# Patient Record
Sex: MALE | Race: WHITE | Employment: UNEMPLOYED | URBAN - METROPOLITAN AREA
[De-identification: names, ages, dates, MRNs, and addresses within clinical notes are randomized per-mention and may not be internally consistent; named-entity substitution may affect disease eponyms.]

---

## 2021-01-31 ENCOUNTER — HOSPITAL ENCOUNTER (EMERGENCY)
Facility: CLINIC | Age: 31
Discharge: HOME | End: 2021-02-01
Attending: EMERGENCY MEDICINE | Admitting: EMERGENCY MEDICINE
Payer: COMMERCIAL

## 2021-01-31 DIAGNOSIS — R41.82 ALTERED MENTAL STATUS, UNSPECIFIED ALTERED MENTAL STATUS TYPE: ICD-10-CM

## 2021-01-31 DIAGNOSIS — F10.920 ALCOHOLIC INTOXICATION WITHOUT COMPLICATION (H): ICD-10-CM

## 2021-01-31 LAB — GLUCOSE BLDC GLUCOMTR-MCNC: 129 MG/DL (ref 70–99)

## 2021-01-31 PROCEDURE — 999N001017 HC STATISTIC GLUCOSE BY METER IP

## 2021-01-31 PROCEDURE — 99285 EMERGENCY DEPT VISIT HI MDM: CPT

## 2021-02-01 VITALS
WEIGHT: 175 LBS | OXYGEN SATURATION: 95 % | HEART RATE: 101 BPM | SYSTOLIC BLOOD PRESSURE: 102 MMHG | DIASTOLIC BLOOD PRESSURE: 66 MMHG | TEMPERATURE: 96.5 F

## 2021-02-01 NOTE — ED PROVIDER NOTES
Patient was signed out to me awaiting clinical sobriety.  Briefly, he presented intoxicated with alcohol last night.  He has no sign of traumatic injury.  He does not have any medical complaints.  He does not wish to have resources for alcohol treatment.  He does not think he will withdraw.    6 AM he is clinically sober.  Ambulatory and steady on his feet.  He is not suicidal or homicidal.  He is discharged, and advised that he may return at any time for signs of withdrawal.     Karthikeyan Wan MD  02/01/21 0612

## 2021-02-01 NOTE — ED PROVIDER NOTES
History   Chief Complaint:  Alcohol Intoxication     HPI   Ramiro Elizondo is a 30 year old male who presents with altered mental status and alcohol intoxication.  Was found altered vs sleeping on the light rail.  Reported drinking alcohol today.  Denies drug use, chest pain, shortness of breath, abdominal pain, injury.  Denies drug use.  Is sleeping and only answers direct questions with short responses.  History limited.    Review of Systems  Limited due to intoxications    Allergies:  The patient has no known allergies.     Medications:  Denies    Past Medical History:     Patient denies a past medical history.    Physical Exam     Patient Vitals for the past 24 hrs:   BP Temp Temp src Pulse SpO2 Weight   01/31/21 2159 118/81 96.5  F (35.8  C) Temporal 92 95 % 79.4 kg (175 lb)       Physical Exam  Eyes:  Sclera white; Pupils are equal and round  ENT:    External ears and nares normal  CV:  Rate as above with regular rhythm   Resp:  Breath sounds clear and equal bilaterally  GI:  Abdomen is soft, non-tender, non-distended    No rebound tenderness or peritoneal features  MS:  Moves all extremities  Skin:  Warm and dry  Neuro:  Speech is slurred    Emergency Department Course     Laboratory:  POC glucose 129    Procedures    Emergency Department Course:    Reviewed:  I reviewed nursing notes and vitals    Assessments:  2213 I obtained history and examined the patient as noted above.       Impression & Plan     Medical Decision Making:  Ramiro Elizondo is a 30 year old male who presents for evaluation of alcohol related issues.  He is intoxicated here in ED by clinical exam and report.  No vital sign abnormalities to suggest infection and no exam findings of trauma or stroke.  He has no signs of trauma related to alcohol use and no further workup is needed including head CT.  Will need further observation in ED for sobering prior to being safe for discharge.  If clinical course is not consistent with alcohol  then additional workup can be obtained.  Signed out to Dr. Wan.    Diagnosis:    ICD-10-CM    1. Altered mental status, unspecified altered mental status type  R41.82    2. Alcoholic intoxication without complication (H)  F10.920        Scribe Disclosure:  I, Juan Schmitz, am serving as a scribe at 10:13 PM on 1/31/2021 to document services personally performed by Amber Monk MD based on my observations and the provider's statements to me.              Amber Monk MD  02/01/21 0020

## 2021-02-01 NOTE — ED NOTES
Bed: ED13  Expected date:   Expected time:   Means of arrival:   Comments:  534  30M ETOH/Dizzy  2150

## 2021-02-01 NOTE — ED TRIAGE NOTES
[place]. He is unable to care for self. Patient unable to stay awake to answer questions at this time